# Patient Record
Sex: FEMALE | Race: WHITE | NOT HISPANIC OR LATINO | Employment: FULL TIME | URBAN - NONMETROPOLITAN AREA
[De-identification: names, ages, dates, MRNs, and addresses within clinical notes are randomized per-mention and may not be internally consistent; named-entity substitution may affect disease eponyms.]

---

## 2024-04-25 ENCOUNTER — HOSPITAL ENCOUNTER (OUTPATIENT)
Dept: RADIOLOGY | Facility: CLINIC | Age: 34
Discharge: HOME | End: 2024-04-25
Payer: COMMERCIAL

## 2024-04-25 DIAGNOSIS — S13.9XXA NECK SPRAIN, INITIAL ENCOUNTER: ICD-10-CM

## 2024-04-25 DIAGNOSIS — R05.1 ACUTE COUGH: ICD-10-CM

## 2024-04-25 DIAGNOSIS — R52 PAIN: ICD-10-CM

## 2024-04-25 PROCEDURE — 71046 X-RAY EXAM CHEST 2 VIEWS: CPT

## 2024-04-25 PROCEDURE — 72050 X-RAY EXAM NECK SPINE 4/5VWS: CPT

## 2024-04-25 PROCEDURE — 71046 X-RAY EXAM CHEST 2 VIEWS: CPT | Performed by: RADIOLOGY

## 2024-04-25 PROCEDURE — 72050 X-RAY EXAM NECK SPINE 4/5VWS: CPT | Performed by: RADIOLOGY

## 2024-12-22 ENCOUNTER — OFFICE VISIT (OUTPATIENT)
Dept: URGENT CARE | Facility: URGENT CARE | Age: 34
End: 2024-12-22
Payer: COMMERCIAL

## 2024-12-22 VITALS
WEIGHT: 145.94 LBS | TEMPERATURE: 98.2 F | RESPIRATION RATE: 20 BRPM | HEART RATE: 75 BPM | SYSTOLIC BLOOD PRESSURE: 123 MMHG | BODY MASS INDEX: 25.85 KG/M2 | OXYGEN SATURATION: 98 % | DIASTOLIC BLOOD PRESSURE: 77 MMHG

## 2024-12-22 DIAGNOSIS — K29.60 BILE-INDUCED GASTRITIS: Primary | ICD-10-CM

## 2024-12-22 DIAGNOSIS — S02.5XXB OPEN FRACTURE OF TOOTH, INITIAL ENCOUNTER: ICD-10-CM

## 2024-12-22 DIAGNOSIS — K04.7 DENTAL INFECTION: ICD-10-CM

## 2024-12-22 PROBLEM — Z98.51 S/P TUBAL LIGATION: Status: ACTIVE | Noted: 2018-02-20

## 2024-12-22 PROBLEM — F17.200 TOBACCO DEPENDENCE: Status: ACTIVE | Noted: 2024-12-22

## 2024-12-22 PROBLEM — O99.012: Status: ACTIVE | Noted: 2017-09-27

## 2024-12-22 PROBLEM — G43.909 MIGRAINES: Status: ACTIVE | Noted: 2024-12-22

## 2024-12-22 PROCEDURE — 99213 OFFICE O/P EST LOW 20 MIN: CPT | Performed by: FAMILY MEDICINE

## 2024-12-22 RX ORDER — AMOXICILLIN 875 MG/1
875 TABLET, FILM COATED ORAL 2 TIMES DAILY
Qty: 20 TABLET | Refills: 1 | Status: SHIPPED | OUTPATIENT
Start: 2024-12-22 | End: 2025-01-01

## 2024-12-22 ASSESSMENT — PAIN SCALES - GENERAL: PAINLEVEL_OUTOF10: 9

## 2024-12-22 NOTE — PROGRESS NOTES
Subjective   Patient ID: Jennifer Barnard is a 34 y.o. female.    HPI: 34-year-old female presents with complaint of pain in the posterior aspect of the left lower jaw.  She reports this began this week.  She is concerned about an abscessed tooth.  She also reports intermittent epigastric pain that started 1 day ago. She is status post cholecystectomy and admits to ongoing gastrointestinal symptoms since this procedure.        History provided by:  Patient   used: No    the following portions of the chart were reviewed this encounter and updated as appropriate:  Tobacco  Allergies  Meds  Problems  Med Hx  Surg Hx  Fam Hx         Review of Systems   Constitutional:  Positive for fever. Negative for chills.   HENT:  Positive for facial swelling. Negative for congestion, ear pain, rhinorrhea, sinus pressure and sore throat.    Respiratory:  Positive for chest tightness. Negative for cough, shortness of breath and wheezing.    Cardiovascular:  Negative for palpitations.   Gastrointestinal:  Negative for abdominal pain, constipation, diarrhea, nausea and vomiting.   Genitourinary:  Negative for dysuria and frequency.   Neurological:  Negative for headaches.     Objective   Physical Exam  Vital signs are reviewed. Alert and oriented x3 with normal mood and affect  Patient is well nourished, well-developed, alert and in no acute distress    External eyes, orbits, conjunctiva and eyelids are normal in appearance  Pupils are equal, round, reactive to light and accommodation, extraocular movements intact    External ears appear normal  External canals are normal in appearance  Right tympanic membrane is intact and pale gray in appearance  Left tympanic membrane is intact and pale gray in appearance  There is no middle ear effusion noted on the right  There is no middle ear effusion noted on the left  External appearance of the nose is normal  Nasal mucosa, septum, turbinates are pink in  appearance  There is no nasal discharge in both nares    Oral mucosa is uniformly pink and moist  Palate is pink, symmetric and intact  Tongue is moist, mobile and midline  Patient has redness and tenderness on palpation along the alveolar ridge laterally there is redness of the mucosa of the left cheek.  There is tenderness in lymph lymphadenopathy in the submandibular lymph nodes on the left side.  There is a severely damaged and decayed tooth in the jaw adjacent to the area of tenderness and swelling.  Posterior pharynx mildly erythematous with no concretions or exudates present  No cervical lymphadenopathy palpated    Heart has regular rate and rhythm. No murmurs, rubs or gallops are auscultated at this exam.    Respiratory rate rhythm and effort are normal. Breath sounds bilaterally are clear on auscultation without crackles, rhonchi, wheezes or friction rub.    Abdomen: Normal bowel sounds on auscultation. Soft, nontender without rebound or rigidity on palpation.  Patient complains of intermittent midline pain this appears to be most significant in the epigastric area.  Procedures    Assessment/Plan   Diagnoses and all orders for this visit:  Bile-induced gastritis  Dental infection  -     amoxicillin (Amoxil) 875 mg tablet; Take 1 tablet (875 mg) by mouth 2 times a day for 10 days.  Open fracture of tooth, initial encounter  -     amoxicillin (Amoxil) 875 mg tablet; Take 1 tablet (875 mg) by mouth 2 times a day for 10 days.    Patient disposition: Home

## 2024-12-22 NOTE — PATIENT INSTRUCTIONS
You have a dental infection secondary to an open fracture of your tooth.  Please increase your oral fluids for the next 7-10 days  Please take amoxicillin as prescribed with food  I recommend use of a probiotic while taking the antibiotics and for an additional 7-10 days after completing treatment. Please ask the pharmacist for advice about an appropriate product for this purpose.  You may mix 1 teaspoon of table salt with 8 ounces of warm water to rinse and gargle your sore throat.  You may do this repeatedly for up to 15 minutes if it seems to relieve your discomfort.  Do not swallow this liquid  May apply warm compress to the cheek over the and involved area to try and encourage drainage.  If successful may use rolled up gauze pad to absorb discharge.    I am suspicious you may have some irritation of your stomach secondary to bile reflux following your gallbladder surgery.  Use Pepcid 20 mg twice daily for the next 7 days.  Then you may decrease frequency if symptoms are controlled.  May also use Mylanta/Maalox for additional symptom relief.  If symptoms persist please call follow-up with gastroenterologist or surgeon who performed your procedure for further evaluation and treatment recommendations.  This note was generated by voice recognition software. Minor transcription/grammatical errors may be present. Please call for clarification.

## 2024-12-30 ASSESSMENT — ENCOUNTER SYMPTOMS
WHEEZING: 0
SINUS PRESSURE: 0
COUGH: 0
CONSTIPATION: 0
HEADACHES: 0
PALPITATIONS: 0
CHILLS: 0
ABDOMINAL PAIN: 0
VOMITING: 0
SHORTNESS OF BREATH: 0
DIARRHEA: 0
CHEST TIGHTNESS: 1
FREQUENCY: 0
RHINORRHEA: 0
DYSURIA: 0
SORE THROAT: 0
FEVER: 1
NAUSEA: 0
FACIAL SWELLING: 1